# Patient Record
Sex: FEMALE | Race: WHITE | NOT HISPANIC OR LATINO | URBAN - METROPOLITAN AREA
[De-identification: names, ages, dates, MRNs, and addresses within clinical notes are randomized per-mention and may not be internally consistent; named-entity substitution may affect disease eponyms.]

---

## 2020-01-22 ENCOUNTER — OUTPATIENT (OUTPATIENT)
Dept: OUTPATIENT SERVICES | Facility: HOSPITAL | Age: 85
LOS: 1 days | Discharge: HOME | End: 2020-01-22
Payer: MEDICARE

## 2020-01-22 VITALS
OXYGEN SATURATION: 99 % | WEIGHT: 139.11 LBS | SYSTOLIC BLOOD PRESSURE: 132 MMHG | HEIGHT: 63 IN | DIASTOLIC BLOOD PRESSURE: 82 MMHG | RESPIRATION RATE: 18 BRPM | TEMPERATURE: 98 F | HEART RATE: 83 BPM

## 2020-01-22 DIAGNOSIS — Z95.5 PRESENCE OF CORONARY ANGIOPLASTY IMPLANT AND GRAFT: Chronic | ICD-10-CM

## 2020-01-22 DIAGNOSIS — Z95.0 PRESENCE OF CARDIAC PACEMAKER: Chronic | ICD-10-CM

## 2020-01-22 DIAGNOSIS — Z95.0 PRESENCE OF CARDIAC PACEMAKER: ICD-10-CM

## 2020-01-22 DIAGNOSIS — Z90.710 ACQUIRED ABSENCE OF BOTH CERVIX AND UTERUS: Chronic | ICD-10-CM

## 2020-01-22 DIAGNOSIS — E03.9 HYPOTHYROIDISM, UNSPECIFIED: ICD-10-CM

## 2020-01-22 DIAGNOSIS — G47.33 OBSTRUCTIVE SLEEP APNEA (ADULT) (PEDIATRIC): ICD-10-CM

## 2020-01-22 DIAGNOSIS — I10 ESSENTIAL (PRIMARY) HYPERTENSION: ICD-10-CM

## 2020-01-22 DIAGNOSIS — Z97.4 PRESENCE OF EXTERNAL HEARING-AID: ICD-10-CM

## 2020-01-22 DIAGNOSIS — Z95.5 PRESENCE OF CORONARY ANGIOPLASTY IMPLANT AND GRAFT: ICD-10-CM

## 2020-01-22 DIAGNOSIS — Z01.818 ENCOUNTER FOR OTHER PREPROCEDURAL EXAMINATION: ICD-10-CM

## 2020-01-22 DIAGNOSIS — M17.12 UNILATERAL PRIMARY OSTEOARTHRITIS, LEFT KNEE: ICD-10-CM

## 2020-01-22 DIAGNOSIS — I25.10 ATHEROSCLEROTIC HEART DISEASE OF NATIVE CORONARY ARTERY WITHOUT ANGINA PECTORIS: ICD-10-CM

## 2020-01-22 DIAGNOSIS — E78.5 HYPERLIPIDEMIA, UNSPECIFIED: ICD-10-CM

## 2020-01-22 LAB
ALLERGY+IMMUNOLOGY DIAG STUDY NOTE: SIGNIFICANT CHANGE UP
ANION GAP SERPL CALC-SCNC: 5 MMOL/L — SIGNIFICANT CHANGE UP (ref 5–17)
ANTIBODY INTERPRETATION 2: SIGNIFICANT CHANGE UP
ANTIBODY INTERPRETATION 3: SIGNIFICANT CHANGE UP
APTT BLD: 33.6 SEC — SIGNIFICANT CHANGE UP (ref 28.5–37)
BASOPHILS # BLD AUTO: 0.03 K/UL — SIGNIFICANT CHANGE UP (ref 0–0.2)
BASOPHILS NFR BLD AUTO: 0.4 % — SIGNIFICANT CHANGE UP (ref 0–2)
BLD GP AB SCN SERPL QL: SIGNIFICANT CHANGE UP
BUN SERPL-MCNC: 20 MG/DL — SIGNIFICANT CHANGE UP (ref 7–23)
CALCIUM SERPL-MCNC: 8.8 MG/DL — SIGNIFICANT CHANGE UP (ref 8.5–10.1)
CHLORIDE SERPL-SCNC: 107 MMOL/L — SIGNIFICANT CHANGE UP (ref 96–108)
CO2 SERPL-SCNC: 32 MMOL/L — HIGH (ref 22–31)
CREAT SERPL-MCNC: 0.63 MG/DL — SIGNIFICANT CHANGE UP (ref 0.5–1.3)
DIR ANTIGLOB POLYSPECIFIC INTERPRETATION: SIGNIFICANT CHANGE UP
EOSINOPHIL # BLD AUTO: 0.11 K/UL — SIGNIFICANT CHANGE UP (ref 0–0.5)
EOSINOPHIL NFR BLD AUTO: 1.6 % — SIGNIFICANT CHANGE UP (ref 0–6)
GLUCOSE SERPL-MCNC: 99 MG/DL — SIGNIFICANT CHANGE UP (ref 70–99)
HBA1C BLD-MCNC: 5.6 % — SIGNIFICANT CHANGE UP (ref 4–5.6)
HCT VFR BLD CALC: 33.3 % — LOW (ref 34.5–45)
HGB BLD-MCNC: 9.9 G/DL — LOW (ref 11.5–15.5)
IMM GRANULOCYTES NFR BLD AUTO: 0.3 % — SIGNIFICANT CHANGE UP (ref 0–1.5)
INR BLD: 1.13 RATIO — SIGNIFICANT CHANGE UP (ref 0.88–1.16)
LYMPHOCYTES # BLD AUTO: 0.9 K/UL — LOW (ref 1–3.3)
LYMPHOCYTES # BLD AUTO: 13.4 % — SIGNIFICANT CHANGE UP (ref 13–44)
MCHC RBC-ENTMCNC: 27.3 PG — SIGNIFICANT CHANGE UP (ref 27–34)
MCHC RBC-ENTMCNC: 29.7 GM/DL — LOW (ref 32–36)
MCV RBC AUTO: 91.7 FL — SIGNIFICANT CHANGE UP (ref 80–100)
MONOCYTES # BLD AUTO: 0.53 K/UL — SIGNIFICANT CHANGE UP (ref 0–0.9)
MONOCYTES NFR BLD AUTO: 7.9 % — SIGNIFICANT CHANGE UP (ref 2–14)
MRSA PCR RESULT.: SIGNIFICANT CHANGE UP
NEUTROPHILS # BLD AUTO: 5.15 K/UL — SIGNIFICANT CHANGE UP (ref 1.8–7.4)
NEUTROPHILS NFR BLD AUTO: 76.4 % — SIGNIFICANT CHANGE UP (ref 43–77)
NRBC # BLD: 0 /100 WBCS — SIGNIFICANT CHANGE UP (ref 0–0)
PLATELET # BLD AUTO: 206 K/UL — SIGNIFICANT CHANGE UP (ref 150–400)
POTASSIUM SERPL-MCNC: 3.5 MMOL/L — SIGNIFICANT CHANGE UP (ref 3.5–5.3)
POTASSIUM SERPL-SCNC: 3.5 MMOL/L — SIGNIFICANT CHANGE UP (ref 3.5–5.3)
PROTHROM AB SERPL-ACNC: 12.7 SEC — SIGNIFICANT CHANGE UP (ref 10–12.9)
RBC # BLD: 3.63 M/UL — LOW (ref 3.8–5.2)
RBC # FLD: 14.6 % — HIGH (ref 10.3–14.5)
S AUREUS DNA NOSE QL NAA+PROBE: SIGNIFICANT CHANGE UP
SODIUM SERPL-SCNC: 144 MMOL/L — SIGNIFICANT CHANGE UP (ref 135–145)
WBC # BLD: 6.74 K/UL — SIGNIFICANT CHANGE UP (ref 3.8–10.5)
WBC # FLD AUTO: 6.74 K/UL — SIGNIFICANT CHANGE UP (ref 3.8–10.5)

## 2020-01-22 PROCEDURE — 93010 ELECTROCARDIOGRAM REPORT: CPT

## 2020-01-22 PROCEDURE — 86077 PHYS BLOOD BANK SERV XMATCH: CPT

## 2020-01-22 NOTE — OCCUPATIONAL THERAPY INITIAL EVALUATION ADULT - ADDITIONAL COMMENTS
Patient lives with family (Who can assist post op) in a private house with 3 steps to enter with a L handrail. Once inside, the patient has 11 steps with a R rail to reach the main floor where the bedroom and bathroom is. The patient bathroom has a walk in shower stall, fixed and retractable shower head, standard toilet and grab bars. The patient reports that a 3/1 commode can fit over the toilet at home. The patient ambulates with no device and owns a straight cane. The patient daily pain in the L knee is a 5-6/10 at rest and a 8-9/10 with movement. The patient reports taking tylenol prn for pain management. The patient has no history for falls and reports having buckling in knee. The patient wears eye glasses, R handed, does not drive and wears hearing aides for both ears. The patient has macular degeneration in L eye.

## 2020-01-22 NOTE — H&P PST ADULT - NEGATIVE CARDIOVASCULAR SYMPTOMS
no dyspnea on exertion/no claudication/no paroxysmal nocturnal dyspnea/no peripheral edema/no chest pain/no palpitations/no orthopnea

## 2020-01-22 NOTE — H&P PST ADULT - HISTORY OF PRESENT ILLNESS
85yo female with medical h/o DONNA denies CPAP use, [place] with bilateral hearing aid, HTN, CAD with stents on Aspirin, HDL, PPM, Hypothyroid and OA, left knee. Pt presents today for PST for Left Total Knee Replacement scheduled for 2/5/2020

## 2020-01-22 NOTE — H&P PST ADULT - NSICDXPROBLEM_GEN_ALL_CORE_FT
PROBLEM DIAGNOSES  Problem: Unilateral primary osteoarthritis, left knee  Assessment and Plan: Pre-op instructions given. Pt verbalized understanding  Chlorhexidine wash instructions given  Pending: Medical & Cardiology clearance - Abnormal EKG + requested by surgeon    Problem: Cardiac pacemaker  Assessment and Plan: Medtronic - placed 11/7/2011    Problem: CAD (coronary artery disease)  Assessment and Plan: Pt instructed to take meds as prescribed    Problem: Stented coronary artery  Assessment and Plan: on Aspirin     Problem: Hearing aid worn  Assessment and Plan: noted- bilateral     Problem: Hypothyroid  Assessment and Plan: Pt instructed to take meds as prescribed     Problem: Hypertension  Assessment and Plan: Pt instructed to take meds as prescribed     Problem: Hyperlipidemia  Assessment and Plan: Pt instructed to take meds as prescribed     Problem: DONNA (obstructive sleep apnea)  Assessment and Plan: denies CPAP

## 2020-01-22 NOTE — PHYSICAL THERAPY INITIAL EVALUATION ADULT - ADDITIONAL COMMENTS
Pt lives in a private house with 3 steps to enter with 1 handrail to L (this is the back entrance), once inside there are 11 steps with 1 handrail to R. Pt lives in a walk in shower with grab bars in the shower, fixed and retractable shower head, standard height toilet. Pt reports that a 3:1 commode can fit over toilet. Pt does not use dme at this time, only has a cane that is easily accessible and is in good working condition. Pain is 5-6/10 at rest and can increase to 8-9/10 with ambulation and standing. Pt has relief with meds, takes tylenol prn, no recent outpatient PT, no recent falls, (+) buckling. Pt wears glasses, R hand dominant, does not drive, has hearing aids.

## 2020-01-22 NOTE — H&P PST ADULT - MUSCULOSKELETAL
details… detailed exam no joint swelling/no calf tenderness/no joint warmth/normal strength/decreased ROM due to pain/no joint erythema

## 2020-01-22 NOTE — H&P PST ADULT - NEGATIVE ENMT SYMPTOMS
no nasal discharge/no ear pain/no tinnitus/no vertigo/no sinus symptoms/no nasal obstruction/no nasal congestion

## 2020-01-22 NOTE — H&P PST ADULT - NS MD HP INPLANTS MED DEV
Pacemaker/Vascular stents/Clips/cardiac stents, PPM Medtronic Implanted 11/7/2011 Serial # [id number] Model##[id number]

## 2020-01-22 NOTE — PHYSICAL THERAPY INITIAL EVALUATION ADULT - MODIFIED CLINICAL TEST OF SENSORY INTEGRATION IN BALANCE TEST
5x Sit to Stand Test = (no hands use) 18.30 seconds, indicating significant impairment c functional mobility & strength  ; 2 Minute Walk Test = 300 feet without devices or rest stops

## 2020-01-22 NOTE — H&P PST ADULT - NEGATIVE MUSCULOSKELETAL SYMPTOMS
no arm pain R/no leg pain R/no joint swelling/no arthralgia/no neck pain/no myalgia/no arm pain L/no back pain/no muscle cramps/no muscle weakness

## 2020-01-22 NOTE — H&P PST ADULT - NSICDXPASTMEDICALHX_GEN_ALL_CORE_FT
PAST MEDICAL HISTORY:  CAD (coronary artery disease)     Hyperlipidemia     Hypertension     Hypothyroid     DONNA (obstructive sleep apnea) denies CPAP use

## 2020-02-05 ENCOUNTER — INPATIENT (INPATIENT)
Facility: HOSPITAL | Age: 85
LOS: 1 days | Discharge: HOME HEALTH | End: 2020-02-07
Attending: ORTHOPAEDIC SURGERY | Admitting: ORTHOPAEDIC SURGERY
Payer: MEDICARE

## 2020-02-05 VITALS
TEMPERATURE: 98 F | SYSTOLIC BLOOD PRESSURE: 133 MMHG | RESPIRATION RATE: 17 BRPM | HEIGHT: 63 IN | WEIGHT: 136.91 LBS | DIASTOLIC BLOOD PRESSURE: 67 MMHG | HEART RATE: 92 BPM | OXYGEN SATURATION: 98 %

## 2020-02-05 DIAGNOSIS — Z90.710 ACQUIRED ABSENCE OF BOTH CERVIX AND UTERUS: Chronic | ICD-10-CM

## 2020-02-05 DIAGNOSIS — Z95.0 PRESENCE OF CARDIAC PACEMAKER: Chronic | ICD-10-CM

## 2020-02-05 DIAGNOSIS — Z95.5 PRESENCE OF CORONARY ANGIOPLASTY IMPLANT AND GRAFT: Chronic | ICD-10-CM

## 2020-02-05 LAB
ANION GAP SERPL CALC-SCNC: 7 MMOL/L — SIGNIFICANT CHANGE UP (ref 5–17)
BLD GP AB SCN SERPL QL: SIGNIFICANT CHANGE UP
BUN SERPL-MCNC: 20 MG/DL — SIGNIFICANT CHANGE UP (ref 7–23)
CALCIUM SERPL-MCNC: 8.5 MG/DL — SIGNIFICANT CHANGE UP (ref 8.5–10.1)
CHLORIDE SERPL-SCNC: 108 MMOL/L — SIGNIFICANT CHANGE UP (ref 96–108)
CO2 SERPL-SCNC: 28 MMOL/L — SIGNIFICANT CHANGE UP (ref 22–31)
CREAT SERPL-MCNC: 0.72 MG/DL — SIGNIFICANT CHANGE UP (ref 0.5–1.3)
GLUCOSE SERPL-MCNC: 122 MG/DL — HIGH (ref 70–99)
HCT VFR BLD CALC: 30 % — LOW (ref 34.5–45)
HGB BLD-MCNC: 9.2 G/DL — LOW (ref 11.5–15.5)
MCHC RBC-ENTMCNC: 27.9 PG — SIGNIFICANT CHANGE UP (ref 27–34)
MCHC RBC-ENTMCNC: 30.7 GM/DL — LOW (ref 32–36)
MCV RBC AUTO: 90.9 FL — SIGNIFICANT CHANGE UP (ref 80–100)
NRBC # BLD: 0 /100 WBCS — SIGNIFICANT CHANGE UP (ref 0–0)
PLATELET # BLD AUTO: 183 K/UL — SIGNIFICANT CHANGE UP (ref 150–400)
POTASSIUM SERPL-MCNC: 3.7 MMOL/L — SIGNIFICANT CHANGE UP (ref 3.5–5.3)
POTASSIUM SERPL-SCNC: 3.7 MMOL/L — SIGNIFICANT CHANGE UP (ref 3.5–5.3)
RBC # BLD: 3.3 M/UL — LOW (ref 3.8–5.2)
RBC # FLD: 14.6 % — HIGH (ref 10.3–14.5)
SODIUM SERPL-SCNC: 143 MMOL/L — SIGNIFICANT CHANGE UP (ref 135–145)
WBC # BLD: 6.71 K/UL — SIGNIFICANT CHANGE UP (ref 3.8–10.5)
WBC # FLD AUTO: 6.71 K/UL — SIGNIFICANT CHANGE UP (ref 3.8–10.5)

## 2020-02-05 PROCEDURE — 88305 TISSUE EXAM BY PATHOLOGIST: CPT | Mod: 26

## 2020-02-05 PROCEDURE — 88311 DECALCIFY TISSUE: CPT | Mod: 26

## 2020-02-05 RX ORDER — SOTALOL HCL 120 MG
80 TABLET ORAL
Refills: 0 | Status: DISCONTINUED | OUTPATIENT
Start: 2020-02-05 | End: 2020-02-07

## 2020-02-05 RX ORDER — OXYCODONE HYDROCHLORIDE 5 MG/1
5 TABLET ORAL EVERY 4 HOURS
Refills: 0 | Status: DISCONTINUED | OUTPATIENT
Start: 2020-02-05 | End: 2020-02-06

## 2020-02-05 RX ORDER — OXYCODONE HYDROCHLORIDE 5 MG/1
10 TABLET ORAL EVERY 4 HOURS
Refills: 0 | Status: DISCONTINUED | OUTPATIENT
Start: 2020-02-05 | End: 2020-02-06

## 2020-02-05 RX ORDER — ISOSORBIDE MONONITRATE 60 MG/1
30 TABLET, EXTENDED RELEASE ORAL DAILY
Refills: 0 | Status: DISCONTINUED | OUTPATIENT
Start: 2020-02-05 | End: 2020-02-07

## 2020-02-05 RX ORDER — ACETAMINOPHEN 500 MG
1000 TABLET ORAL ONCE
Refills: 0 | Status: COMPLETED | OUTPATIENT
Start: 2020-02-05 | End: 2020-02-05

## 2020-02-05 RX ORDER — SENNA PLUS 8.6 MG/1
2 TABLET ORAL AT BEDTIME
Refills: 0 | Status: DISCONTINUED | OUTPATIENT
Start: 2020-02-05 | End: 2020-02-07

## 2020-02-05 RX ORDER — ONDANSETRON 8 MG/1
4 TABLET, FILM COATED ORAL EVERY 6 HOURS
Refills: 0 | Status: DISCONTINUED | OUTPATIENT
Start: 2020-02-05 | End: 2020-02-07

## 2020-02-05 RX ORDER — FENTANYL CITRATE 50 UG/ML
25 INJECTION INTRAVENOUS
Refills: 0 | Status: DISCONTINUED | OUTPATIENT
Start: 2020-02-05 | End: 2020-02-05

## 2020-02-05 RX ORDER — DEXAMETHASONE 0.5 MG/5ML
10 ELIXIR ORAL ONCE
Refills: 0 | Status: COMPLETED | OUTPATIENT
Start: 2020-02-06 | End: 2020-02-06

## 2020-02-05 RX ORDER — HYDROMORPHONE HYDROCHLORIDE 2 MG/ML
1 INJECTION INTRAMUSCULAR; INTRAVENOUS; SUBCUTANEOUS EVERY 4 HOURS
Refills: 0 | Status: DISCONTINUED | OUTPATIENT
Start: 2020-02-05 | End: 2020-02-07

## 2020-02-05 RX ORDER — FOLIC ACID 0.8 MG
1 TABLET ORAL DAILY
Refills: 0 | Status: DISCONTINUED | OUTPATIENT
Start: 2020-02-05 | End: 2020-02-07

## 2020-02-05 RX ORDER — ACETAMINOPHEN 500 MG
975 TABLET ORAL EVERY 8 HOURS
Refills: 0 | Status: DISCONTINUED | OUTPATIENT
Start: 2020-02-05 | End: 2020-02-07

## 2020-02-05 RX ORDER — FERROUS SULFATE 325(65) MG
325 TABLET ORAL
Refills: 0 | Status: DISCONTINUED | OUTPATIENT
Start: 2020-02-05 | End: 2020-02-07

## 2020-02-05 RX ORDER — LEVOTHYROXINE SODIUM 125 MCG
1 TABLET ORAL
Qty: 0 | Refills: 0 | DISCHARGE

## 2020-02-05 RX ORDER — ATORVASTATIN CALCIUM 80 MG/1
20 TABLET, FILM COATED ORAL AT BEDTIME
Refills: 0 | Status: DISCONTINUED | OUTPATIENT
Start: 2020-02-05 | End: 2020-02-07

## 2020-02-05 RX ORDER — FUROSEMIDE 40 MG
1 TABLET ORAL
Qty: 0 | Refills: 0 | DISCHARGE

## 2020-02-05 RX ORDER — ASPIRIN/CALCIUM CARB/MAGNESIUM 324 MG
325 TABLET ORAL
Refills: 0 | Status: DISCONTINUED | OUTPATIENT
Start: 2020-02-06 | End: 2020-02-07

## 2020-02-05 RX ORDER — ISOSORBIDE DINITRATE 5 MG/1
30 TABLET ORAL
Qty: 0 | Refills: 0 | DISCHARGE

## 2020-02-05 RX ORDER — METOCLOPRAMIDE HCL 10 MG
10 TABLET ORAL ONCE
Refills: 0 | Status: COMPLETED | OUTPATIENT
Start: 2020-02-05 | End: 2020-02-05

## 2020-02-05 RX ORDER — CEFAZOLIN SODIUM 1 G
2000 VIAL (EA) INJECTION EVERY 8 HOURS
Refills: 0 | Status: COMPLETED | OUTPATIENT
Start: 2020-02-05 | End: 2020-02-06

## 2020-02-05 RX ORDER — SODIUM CHLORIDE 9 MG/ML
1000 INJECTION, SOLUTION INTRAVENOUS
Refills: 0 | Status: DISCONTINUED | OUTPATIENT
Start: 2020-02-05 | End: 2020-02-05

## 2020-02-05 RX ORDER — ACETAMINOPHEN 500 MG
650 TABLET ORAL ONCE
Refills: 0 | Status: COMPLETED | OUTPATIENT
Start: 2020-02-05 | End: 2020-02-05

## 2020-02-05 RX ORDER — ZALEPLON 10 MG
5 CAPSULE ORAL AT BEDTIME
Refills: 0 | Status: DISCONTINUED | OUTPATIENT
Start: 2020-02-05 | End: 2020-02-07

## 2020-02-05 RX ORDER — ATORVASTATIN CALCIUM 80 MG/1
1 TABLET, FILM COATED ORAL
Qty: 0 | Refills: 0 | DISCHARGE

## 2020-02-05 RX ORDER — POLYETHYLENE GLYCOL 3350 17 G/17G
17 POWDER, FOR SOLUTION ORAL DAILY
Refills: 0 | Status: DISCONTINUED | OUTPATIENT
Start: 2020-02-05 | End: 2020-02-07

## 2020-02-05 RX ORDER — SODIUM CHLORIDE 9 MG/ML
3 INJECTION INTRAMUSCULAR; INTRAVENOUS; SUBCUTANEOUS EVERY 8 HOURS
Refills: 0 | Status: DISCONTINUED | OUTPATIENT
Start: 2020-02-05 | End: 2020-02-05

## 2020-02-05 RX ORDER — FENTANYL CITRATE 50 UG/ML
50 INJECTION INTRAVENOUS ONCE
Refills: 0 | Status: DISCONTINUED | OUTPATIENT
Start: 2020-02-05 | End: 2020-02-05

## 2020-02-05 RX ORDER — FUROSEMIDE 40 MG
20 TABLET ORAL DAILY
Refills: 0 | Status: DISCONTINUED | OUTPATIENT
Start: 2020-02-05 | End: 2020-02-07

## 2020-02-05 RX ORDER — LEVOTHYROXINE SODIUM 125 MCG
50 TABLET ORAL DAILY
Refills: 0 | Status: DISCONTINUED | OUTPATIENT
Start: 2020-02-05 | End: 2020-02-07

## 2020-02-05 RX ORDER — SODIUM CHLORIDE 9 MG/ML
1000 INJECTION, SOLUTION INTRAVENOUS
Refills: 0 | Status: DISCONTINUED | OUTPATIENT
Start: 2020-02-05 | End: 2020-02-06

## 2020-02-05 RX ORDER — PANTOPRAZOLE SODIUM 20 MG/1
40 TABLET, DELAYED RELEASE ORAL
Refills: 0 | Status: DISCONTINUED | OUTPATIENT
Start: 2020-02-05 | End: 2020-02-07

## 2020-02-05 RX ORDER — SOTALOL HCL 120 MG
1 TABLET ORAL
Qty: 0 | Refills: 0 | DISCHARGE

## 2020-02-05 RX ORDER — ONDANSETRON 8 MG/1
4 TABLET, FILM COATED ORAL ONCE
Refills: 0 | Status: DISCONTINUED | OUTPATIENT
Start: 2020-02-05 | End: 2020-02-05

## 2020-02-05 RX ORDER — ASCORBIC ACID 60 MG
500 TABLET,CHEWABLE ORAL
Refills: 0 | Status: DISCONTINUED | OUTPATIENT
Start: 2020-02-05 | End: 2020-02-07

## 2020-02-05 RX ORDER — MAGNESIUM HYDROXIDE 400 MG/1
30 TABLET, CHEWABLE ORAL DAILY
Refills: 0 | Status: DISCONTINUED | OUTPATIENT
Start: 2020-02-05 | End: 2020-02-07

## 2020-02-05 RX ADMIN — OXYCODONE HYDROCHLORIDE 5 MILLIGRAM(S): 5 TABLET ORAL at 15:12

## 2020-02-05 RX ADMIN — SODIUM CHLORIDE 150 MILLILITER(S): 9 INJECTION, SOLUTION INTRAVENOUS at 18:30

## 2020-02-05 RX ADMIN — SODIUM CHLORIDE 100 MILLILITER(S): 9 INJECTION, SOLUTION INTRAVENOUS at 12:33

## 2020-02-05 RX ADMIN — OXYCODONE HYDROCHLORIDE 5 MILLIGRAM(S): 5 TABLET ORAL at 22:32

## 2020-02-05 RX ADMIN — ONDANSETRON 4 MILLIGRAM(S): 8 TABLET, FILM COATED ORAL at 18:35

## 2020-02-05 RX ADMIN — Medication 100 MILLIGRAM(S): at 18:30

## 2020-02-05 RX ADMIN — OXYCODONE HYDROCHLORIDE 5 MILLIGRAM(S): 5 TABLET ORAL at 19:22

## 2020-02-05 RX ADMIN — Medication 650 MILLIGRAM(S): at 09:25

## 2020-02-05 RX ADMIN — Medication 10 MILLIGRAM(S): at 21:32

## 2020-02-05 RX ADMIN — Medication 400 MILLIGRAM(S): at 21:32

## 2020-02-05 RX ADMIN — OXYCODONE HYDROCHLORIDE 5 MILLIGRAM(S): 5 TABLET ORAL at 16:00

## 2020-02-05 RX ADMIN — Medication 975 MILLIGRAM(S): at 15:13

## 2020-02-05 RX ADMIN — Medication 1000 MILLIGRAM(S): at 21:47

## 2020-02-05 RX ADMIN — Medication 975 MILLIGRAM(S): at 16:00

## 2020-02-05 RX ADMIN — OXYCODONE HYDROCHLORIDE 5 MILLIGRAM(S): 5 TABLET ORAL at 18:35

## 2020-02-05 RX ADMIN — OXYCODONE HYDROCHLORIDE 5 MILLIGRAM(S): 5 TABLET ORAL at 21:32

## 2020-02-05 NOTE — PHYSICAL THERAPY INITIAL EVALUATION ADULT - ADDITIONAL COMMENTS
Verified postoperatively, Pt lives in a private house with 3 steps to enter with 1 handrail to L (this is the back entrance), once inside there are 11 steps with 1 handrail to R. Pt lives in a walk in shower with grab bars in the shower, fixed and retractable shower head, standard height toilet. Pt reports that a 3:1 commode can fit over toilet. Pt does not use dme at this time, only has a cane that is easily accessible and is in good working condition. Pain is 5-6/10 at rest and can increase to 8-9/10 with ambulation and standing. Pt has relief with meds, takes tylenol prn, no recent outpatient PT, no recent falls, (+) buckling. Pt wears glasses, R hand dominant, does not drive, has hearing aids.

## 2020-02-05 NOTE — OCCUPATIONAL THERAPY INITIAL EVALUATION ADULT - BALANCE TRAINING, PT EVAL
Pt will improve standing balance to Good+ to facilitate safe functional transfers by the end of 2 weeks.

## 2020-02-05 NOTE — DISCHARGE NOTE PROVIDER - PROVIDER RX CONTACT NUMBER
continue with oral iron.  follow up in 2 weeks.  call if difficulty breathing, blurred vision [phone]

## 2020-02-05 NOTE — PHYSICAL THERAPY INITIAL EVALUATION ADULT - PLANNED THERAPY INTERVENTIONS, PT EVAL
balance training/bed mobility training/gait training/Pt will be able to negotiate >4 steps using left rail up & down, 11 steps using right rail up, a cane independently without c/o pain in the left knee in 2 to 3 days/transfer training/strengthening

## 2020-02-05 NOTE — PROGRESS NOTE ADULT - SUBJECTIVE AND OBJECTIVE BOX
Post-op Check   POD#0 S/P Left TKA   86yFemale Patient seen and examined, Pain controlled  Patient Denies SOB, CP, N/V/D       PE: Left Knee/LE: Dressing C/D/I, Sensation/motor intact, DP 2+, FROM ankle/toes   B/L LE: Skin intact. +ROM hip/knee/ankle/toes. Ankle Dorsi/plantarflexion: 5/5. Calf: soft, compressible and nontender. DP/PT 2+ NVI                          9.2    6.71  )-----------( 183      ( 05 Feb 2020 12:23 )             30.0       02-05    143  |  108  |  20  ----------------------------<  122<H>  3.7   |  28  |  0.72    Ca    8.5      05 Feb 2020 12:23          A: As above   P: Pain Control       DVT Prophylaxis      Incentive spirometry      PT WBAT LLE      Isometric exercises      Discharge Planning      All the above discussed and understood by pt       Ortho to F/U

## 2020-02-05 NOTE — CONSULT NOTE ADULT - SUBJECTIVE AND OBJECTIVE BOX
Statement Selected
LISA PEDROZA is a 86y Female s/p LEFT TOTAL KNEE ARTHROPLASTY    w/ h/o DONNA (obstructive sleep apnea)  Hyperlipidemia  Hypertension  CAD (coronary artery disease)  Hypothyroid    denies any chest pain shortness of breath palpitation dizziness lightheadedness nausea vomiting fever or chills    Stented coronary artery  S/P hysterectomy  Pacemaker    No pertinent family history in first degree relatives    SH: doesnot smoke or drink at this time    macrolide antibiotics (Hives)  penicillins (Hives)  sulfa drugs (Hives)    acetaminophen   Tablet .. 975 milliGRAM(s) Oral every 8 hours  aluminum hydroxide/magnesium hydroxide/simethicone Suspension 30 milliLiter(s) Oral four times a day PRN  ascorbic acid 500 milliGRAM(s) Oral two times a day  atorvastatin 20 milliGRAM(s) Oral at bedtime  ceFAZolin   IVPB 2000 milliGRAM(s) IV Intermittent every 8 hours  ferrous    sulfate 325 milliGRAM(s) Oral three times a day with meals  folic acid 1 milliGRAM(s) Oral daily  furosemide    Tablet 20 milliGRAM(s) Oral daily  HYDROmorphone  Injectable 1 milliGRAM(s) IV Push every 4 hours PRN  isosorbide   mononitrate ER Tablet (IMDUR) 30 milliGRAM(s) Oral daily  lactated ringers. 1000 milliLiter(s) IV Continuous <Continuous>  levothyroxine 50 MICROGram(s) Oral daily  magnesium hydroxide Suspension 30 milliLiter(s) Oral daily PRN  multivitamin 1 Tablet(s) Oral daily  ondansetron Injectable 4 milliGRAM(s) IV Push every 6 hours PRN  oxyCODONE    IR 5 milliGRAM(s) Oral every 4 hours PRN  oxyCODONE    IR 10 milliGRAM(s) Oral every 4 hours PRN  oxyCODONE    IR 5 milliGRAM(s) Oral every 4 hours  pantoprazole    Tablet 40 milliGRAM(s) Oral before breakfast  polyethylene glycol 3350 17 Gram(s) Oral daily  senna 2 Tablet(s) Oral at bedtime PRN  sotalol 80 milliGRAM(s) Oral two times a day  zaleplon 5 milliGRAM(s) Oral at bedtime PRN    T(C): 36.2 (02-05-20 @ 16:20), Max: 36.4 (02-05-20 @ 09:00)  HR: 66 (02-05-20 @ 16:20) (60 - 92)  BP: 167/90 (02-05-20 @ 16:20) (105/62 - 167/90)  RR: 17 (02-05-20 @ 16:20) (14 - 20)  SpO2: 96% (02-05-20 @ 16:20) (96% - 99%)  HEENT unremarkable  neck no JVD or bruit  heart normal S1 S2 RRR no gallops or rubs  chest clear to auscultation  abd sof nontender non distended +bs  ext no calf tenderness    A/P   DVT PX  pain control  bowel regimen   wound care as per ortho  GI PX  antiemetics prn  incentive spirometer

## 2020-02-05 NOTE — DISCHARGE NOTE PROVIDER - NSDCMRMEDTOKEN_GEN_ALL_CORE_FT
aspirin 81 mg oral tablet: 1 tab(s) orally once a day  atorvastatin 20 mg oral tablet: 1 tab(s) orally once a day (at bedtime)  furosemide 20 mg oral tablet: 1 tab(s) orally once a day  isosorbide: 30 milligram(s) orally once a day  levothyroxine 50 mcg (0.05 mg) oral tablet: 1 tab(s) orally once a day  sotalol 80 mg oral tablet: 1 tab(s) orally 2 times a day  traMADol 50 mg oral tablet: 1 tab(s) orally every 4 hours, As Needed acetaminophen 325 mg oral tablet: 3 tab(s) orally every 8 hours  ascorbic acid 500 mg oral tablet: 1 tab(s) orally 2 times a day  aspirin 325 mg oral delayed release tablet: 1 tab(s) orally 2 times a day MDD:2  atorvastatin 20 mg oral tablet: 1 tab(s) orally once a day (at bedtime)  furosemide 20 mg oral tablet: 1 tab(s) orally once a day  isosorbide: 30 milligram(s) orally once a day  levothyroxine 50 mcg (0.05 mg) oral tablet: 1 tab(s) orally once a day  magnesium hydroxide 8% oral suspension: 30 milliliter(s) orally once a day, As needed, Constipation  Multiple Vitamins oral tablet: 1 tab(s) orally once a day  pantoprazole 40 mg oral delayed release tablet: 1 tab(s) orally once a day (before a meal) MDD:1  polyethylene glycol 3350 oral powder for reconstitution: 17 gram(s) orally once a day  sotalol 80 mg oral tablet: 1 tab(s) orally 2 times a day

## 2020-02-05 NOTE — PHYSICAL THERAPY INITIAL EVALUATION ADULT - CRITERIA FOR SKILLED THERAPEUTIC INTERVENTIONS
risk reduction/prevention/therapy frequency/anticipated equipment needs at discharge/anticipated discharge recommendation/impairments found/home with home PT, Pt has DME/rehab potential/predicted duration of therapy intervention/functional limitations in following categories

## 2020-02-05 NOTE — DISCHARGE NOTE PROVIDER - HOSPITAL COURSE
86yFemale with history of Left Knee Osteoarthritis presenting for Left TKA by Dr. Conroy on 2/5/20. Risk and benefits of surgery were explained to the patient.The patient understood and agreed to proceed with surgery. Patient underwent the procedure with no intraoperative complications. Pt was brought in stable condition to the PACU. Once stable in PACU, pt was brought to the floor. During hospital stay pt was followed by Medicine during this admission. Pt had an uneventful hospital course. Pt is stable for discharge to [rehab/home] on POD#[ ] 86yFemale with history of Left Knee Osteoarthritis presenting for Left TKA by Dr. Conroy on 2/5/20. Risk and benefits of surgery were explained to the patient.The patient understood and agreed to proceed with surgery. Patient underwent the procedure with no intraoperative complications. Pt was brought in stable condition to the PACU. Once stable in PACU, pt was brought to the floor. During hospital stay pt was followed by Medicine during this admission. Pt had nausea and needed to stop her narcotics during hospital course. She had hypokalmeia and she had potassium supplementation. Pt is stable for discharge to home on POD#2

## 2020-02-05 NOTE — DISCHARGE NOTE PROVIDER - CARE PROVIDER_API CALL
Al Conroy)  Orthopaedic Surgery  [address] [place] [address]  Phone: [phone]  Fax: [phone]  Follow Up Time:

## 2020-02-05 NOTE — PHYSICAL THERAPY INITIAL EVALUATION ADULT - TRANSFER SAFETY CONCERNS NOTED: BED/CHAIR, REHAB EVAL
decreased safety awareness/decreased step length/decreased balance during turns/stepping too close to front of assistive device/decreased weight-shifting ability

## 2020-02-05 NOTE — PHYSICAL THERAPY INITIAL EVALUATION ADULT - TRANSFER TRAINING, PT EVAL
Pt will be able to perform sit to stand, stand pivot transfer using [RW] and maintaining WB precautions  independently in 2 to 3 days

## 2020-02-05 NOTE — PHYSICAL THERAPY INITIAL EVALUATION ADULT - BALANCE TRAINING, PT EVAL
Pt will improve static & dynamic standing balance to Good using [Rolling walker] maintaining WB precaution  to perform ADL, Gait independently  in 2 weeks

## 2020-02-05 NOTE — OCCUPATIONAL THERAPY INITIAL EVALUATION ADULT - ADDITIONAL COMMENTS
Pre-operative assessment confirmed with Pt. Pt lives with family ( son and spouse, who can assist post op) in a private house with 3 steps to enter with  L handrail. Pt has 11 steps with R handrail inside house to bedroom and bathroom. Pt reports she was independent with ADL's and functional transfers/ ambulation with use of cane., Bathroom contains walk in shower, fixed and retractable shower head, standard toilet and grab bars.

## 2020-02-05 NOTE — OCCUPATIONAL THERAPY INITIAL EVALUATION ADULT - GENERAL OBSERVATIONS, REHAB EVAL
OT evaluation completed. Encountered Pt semi supine in bed, NAD, with spouse and daughter present + cardiac monitor, penumatic teds, IV hep lock intact to R hand and ace wrap to L knee s/p L TKA POD 0 + WBAT.

## 2020-02-05 NOTE — OCCUPATIONAL THERAPY INITIAL EVALUATION ADULT - TRANSFER TRAINING, PT EVAL
Pt will perform safe functional transfers with use of rolling walker with Modified [place] by the end of 1 week.

## 2020-02-05 NOTE — OCCUPATIONAL THERAPY INITIAL EVALUATION ADULT - RANGE OF MOTION EXAMINATION, LOWER EXTREMITY
LLE: hip WFL, knee flexion/ extension grossly decreased by 25%, ankle dorsi flexion/ plantar flexion WFL/Right LE Active ROM was WFL   (within functional limits)

## 2020-02-05 NOTE — DISCHARGE NOTE PROVIDER - NSDCACTIVITY_GEN_ALL_CORE
No heavy lifting/straining/Showering allowed/Stairs allowed/Do not drive or operate machinery/Walking - Outdoors allowed/Walking - Indoors allowed

## 2020-02-05 NOTE — PHYSICAL THERAPY INITIAL EVALUATION ADULT - GAIT TRAINING, PT EVAL
Pt will be able to ambulate 350 feet using [RW] and maintaining WB precautions  independently in 2 weeks

## 2020-02-06 LAB
ANION GAP SERPL CALC-SCNC: 7 MMOL/L — SIGNIFICANT CHANGE UP (ref 5–17)
BUN SERPL-MCNC: 16 MG/DL — SIGNIFICANT CHANGE UP (ref 7–23)
CALCIUM SERPL-MCNC: 8.5 MG/DL — SIGNIFICANT CHANGE UP (ref 8.5–10.1)
CHLORIDE SERPL-SCNC: 103 MMOL/L — SIGNIFICANT CHANGE UP (ref 96–108)
CO2 SERPL-SCNC: 28 MMOL/L — SIGNIFICANT CHANGE UP (ref 22–31)
CREAT SERPL-MCNC: 0.59 MG/DL — SIGNIFICANT CHANGE UP (ref 0.5–1.3)
GLUCOSE SERPL-MCNC: 156 MG/DL — HIGH (ref 70–99)
HCT VFR BLD CALC: 29.9 % — LOW (ref 34.5–45)
HGB BLD-MCNC: 9.1 G/DL — LOW (ref 11.5–15.5)
MCHC RBC-ENTMCNC: 27.2 PG — SIGNIFICANT CHANGE UP (ref 27–34)
MCHC RBC-ENTMCNC: 30.4 GM/DL — LOW (ref 32–36)
MCV RBC AUTO: 89.5 FL — SIGNIFICANT CHANGE UP (ref 80–100)
NRBC # BLD: 0 /100 WBCS — SIGNIFICANT CHANGE UP (ref 0–0)
PLATELET # BLD AUTO: 192 K/UL — SIGNIFICANT CHANGE UP (ref 150–400)
POTASSIUM SERPL-MCNC: 3.6 MMOL/L — SIGNIFICANT CHANGE UP (ref 3.5–5.3)
POTASSIUM SERPL-SCNC: 3.6 MMOL/L — SIGNIFICANT CHANGE UP (ref 3.5–5.3)
RBC # BLD: 3.34 M/UL — LOW (ref 3.8–5.2)
RBC # FLD: 14.5 % — SIGNIFICANT CHANGE UP (ref 10.3–14.5)
SODIUM SERPL-SCNC: 138 MMOL/L — SIGNIFICANT CHANGE UP (ref 135–145)
WBC # BLD: 9.07 K/UL — SIGNIFICANT CHANGE UP (ref 3.8–10.5)
WBC # FLD AUTO: 9.07 K/UL — SIGNIFICANT CHANGE UP (ref 3.8–10.5)

## 2020-02-06 PROCEDURE — 73560 X-RAY EXAM OF KNEE 1 OR 2: CPT | Mod: 26,LT

## 2020-02-06 RX ORDER — METOCLOPRAMIDE HCL 10 MG
10 TABLET ORAL EVERY 6 HOURS
Refills: 0 | Status: DISCONTINUED | OUTPATIENT
Start: 2020-02-06 | End: 2020-02-07

## 2020-02-06 RX ORDER — TRAMADOL HYDROCHLORIDE 50 MG/1
50 TABLET ORAL EVERY 4 HOURS
Refills: 0 | Status: DISCONTINUED | OUTPATIENT
Start: 2020-02-06 | End: 2020-02-07

## 2020-02-06 RX ORDER — METOCLOPRAMIDE HCL 10 MG
10 TABLET ORAL ONCE
Refills: 0 | Status: COMPLETED | OUTPATIENT
Start: 2020-02-06 | End: 2020-02-06

## 2020-02-06 RX ORDER — ACETAMINOPHEN 500 MG
1000 TABLET ORAL ONCE
Refills: 0 | Status: COMPLETED | OUTPATIENT
Start: 2020-02-06 | End: 2020-02-06

## 2020-02-06 RX ORDER — SODIUM CHLORIDE 9 MG/ML
1000 INJECTION, SOLUTION INTRAVENOUS
Refills: 0 | Status: DISCONTINUED | OUTPATIENT
Start: 2020-02-06 | End: 2020-02-06

## 2020-02-06 RX ORDER — KETOROLAC TROMETHAMINE 30 MG/ML
15 SYRINGE (ML) INJECTION ONCE
Refills: 0 | Status: DISCONTINUED | OUTPATIENT
Start: 2020-02-06 | End: 2020-02-06

## 2020-02-06 RX ORDER — SODIUM CHLORIDE 9 MG/ML
1000 INJECTION, SOLUTION INTRAVENOUS
Refills: 0 | Status: DISCONTINUED | OUTPATIENT
Start: 2020-02-06 | End: 2020-02-07

## 2020-02-06 RX ADMIN — Medication 50 MICROGRAM(S): at 05:10

## 2020-02-06 RX ADMIN — SODIUM CHLORIDE 500 MILLILITER(S): 9 INJECTION, SOLUTION INTRAVENOUS at 13:12

## 2020-02-06 RX ADMIN — MAGNESIUM HYDROXIDE 30 MILLILITER(S): 400 TABLET, CHEWABLE ORAL at 17:16

## 2020-02-06 RX ADMIN — Medication 1000 MILLIGRAM(S): at 05:58

## 2020-02-06 RX ADMIN — Medication 80 MILLIGRAM(S): at 17:29

## 2020-02-06 RX ADMIN — Medication 30 MILLILITER(S): at 17:16

## 2020-02-06 RX ADMIN — Medication 15 MILLIGRAM(S): at 10:40

## 2020-02-06 RX ADMIN — Medication 102 MILLIGRAM(S): at 05:10

## 2020-02-06 RX ADMIN — Medication 80 MILLIGRAM(S): at 05:43

## 2020-02-06 RX ADMIN — Medication 15 MILLIGRAM(S): at 10:03

## 2020-02-06 RX ADMIN — Medication 1000 MILLIGRAM(S): at 15:51

## 2020-02-06 RX ADMIN — Medication 325 MILLIGRAM(S): at 08:39

## 2020-02-06 RX ADMIN — Medication 400 MILLIGRAM(S): at 14:49

## 2020-02-06 RX ADMIN — Medication 325 MILLIGRAM(S): at 05:43

## 2020-02-06 RX ADMIN — POLYETHYLENE GLYCOL 3350 17 GRAM(S): 17 POWDER, FOR SOLUTION ORAL at 11:21

## 2020-02-06 RX ADMIN — Medication 975 MILLIGRAM(S): at 23:49

## 2020-02-06 RX ADMIN — Medication 20 MILLIGRAM(S): at 08:39

## 2020-02-06 RX ADMIN — PANTOPRAZOLE SODIUM 40 MILLIGRAM(S): 20 TABLET, DELAYED RELEASE ORAL at 08:39

## 2020-02-06 RX ADMIN — ISOSORBIDE MONONITRATE 30 MILLIGRAM(S): 60 TABLET, EXTENDED RELEASE ORAL at 11:22

## 2020-02-06 RX ADMIN — Medication 325 MILLIGRAM(S): at 17:29

## 2020-02-06 RX ADMIN — ATORVASTATIN CALCIUM 20 MILLIGRAM(S): 80 TABLET, FILM COATED ORAL at 21:23

## 2020-02-06 RX ADMIN — ONDANSETRON 4 MILLIGRAM(S): 8 TABLET, FILM COATED ORAL at 08:38

## 2020-02-06 RX ADMIN — Medication 100 MILLIGRAM(S): at 01:43

## 2020-02-06 RX ADMIN — Medication 10 MILLIGRAM(S): at 13:12

## 2020-02-06 RX ADMIN — Medication 500 MILLIGRAM(S): at 17:29

## 2020-02-06 RX ADMIN — ONDANSETRON 4 MILLIGRAM(S): 8 TABLET, FILM COATED ORAL at 01:43

## 2020-02-06 RX ADMIN — OXYCODONE HYDROCHLORIDE 5 MILLIGRAM(S): 5 TABLET ORAL at 01:42

## 2020-02-06 RX ADMIN — OXYCODONE HYDROCHLORIDE 5 MILLIGRAM(S): 5 TABLET ORAL at 02:42

## 2020-02-06 RX ADMIN — Medication 400 MILLIGRAM(S): at 05:43

## 2020-02-06 RX ADMIN — Medication 500 MILLIGRAM(S): at 05:43

## 2020-02-06 RX ADMIN — Medication 10 MILLIGRAM(S): at 05:42

## 2020-02-06 NOTE — PROGRESS NOTE ADULT - SUBJECTIVE AND OBJECTIVE BOX
LISA PEDROZA is a 86y Female s/p LEFT TOTAL KNEE ARTHROPLASTY      denies any chest pain shortness of breath palpitation dizziness lightheadedness nausea vomiting fever or chills    T(C): 36.3 (02-06-20 @ 09:00), Max: 37 (02-06-20 @ 00:15)  HR: 62 (02-06-20 @ 09:00) (60 - 89)  BP: 135/65 (02-06-20 @ 09:00) (105/62 - 174/79)  RR: 17 (02-06-20 @ 09:00) (14 - 20)  SpO2: 94% (02-06-20 @ 09:00) (94% - 99%)  no jvd/bruit  s1 s2 rrr  cta  s/nt/nd  no calf tend                        9.1    9.07  )-----------( 192      ( 06 Feb 2020 07:06 )             29.9   02-06    138  |  103  |  16  ----------------------------<  156<H>  3.6   |  28  |  0.59    Ca    8.5      06 Feb 2020 07:06        cont dvt px  pain control  bowel regimen  antiemetics  incentive spirometer

## 2020-02-06 NOTE — DISCHARGE NOTE NURSING/CASE MANAGEMENT/SOCIAL WORK - PATIENT PORTAL LINK FT
You can access the FollowMyHealth Patient Portal offered by [facility] by registering at the following website: [email or web address]. By joining [facility]’s FollowMyHealth portal, you will also be able to view your health information using other applications (apps) compatible with our system.

## 2020-02-07 VITALS
OXYGEN SATURATION: 96 % | DIASTOLIC BLOOD PRESSURE: 76 MMHG | SYSTOLIC BLOOD PRESSURE: 148 MMHG | RESPIRATION RATE: 17 BRPM | HEART RATE: 72 BPM | TEMPERATURE: 97 F

## 2020-02-07 LAB
ANION GAP SERPL CALC-SCNC: 6 MMOL/L — SIGNIFICANT CHANGE UP (ref 5–17)
BUN SERPL-MCNC: 13 MG/DL — SIGNIFICANT CHANGE UP (ref 7–23)
CALCIUM SERPL-MCNC: 9.2 MG/DL — SIGNIFICANT CHANGE UP (ref 8.5–10.1)
CHLORIDE SERPL-SCNC: 99 MMOL/L — SIGNIFICANT CHANGE UP (ref 96–108)
CO2 SERPL-SCNC: 31 MMOL/L — SIGNIFICANT CHANGE UP (ref 22–31)
CREAT SERPL-MCNC: 0.7 MG/DL — SIGNIFICANT CHANGE UP (ref 0.5–1.3)
GLUCOSE SERPL-MCNC: 105 MG/DL — HIGH (ref 70–99)
HCT VFR BLD CALC: 28.9 % — LOW (ref 34.5–45)
HGB BLD-MCNC: 8.9 G/DL — LOW (ref 11.5–15.5)
MCHC RBC-ENTMCNC: 27.8 PG — SIGNIFICANT CHANGE UP (ref 27–34)
MCHC RBC-ENTMCNC: 30.8 GM/DL — LOW (ref 32–36)
MCV RBC AUTO: 90.3 FL — SIGNIFICANT CHANGE UP (ref 80–100)
NRBC # BLD: 0 /100 WBCS — SIGNIFICANT CHANGE UP (ref 0–0)
PLATELET # BLD AUTO: 212 K/UL — SIGNIFICANT CHANGE UP (ref 150–400)
POTASSIUM SERPL-MCNC: 3.3 MMOL/L — LOW (ref 3.5–5.3)
POTASSIUM SERPL-SCNC: 3.3 MMOL/L — LOW (ref 3.5–5.3)
RBC # BLD: 3.2 M/UL — LOW (ref 3.8–5.2)
RBC # FLD: 14.7 % — HIGH (ref 10.3–14.5)
SODIUM SERPL-SCNC: 136 MMOL/L — SIGNIFICANT CHANGE UP (ref 135–145)
SURGICAL PATHOLOGY STUDY: SIGNIFICANT CHANGE UP
WBC # BLD: 10.86 K/UL — HIGH (ref 3.8–10.5)
WBC # FLD AUTO: 10.86 K/UL — HIGH (ref 3.8–10.5)

## 2020-02-07 RX ORDER — PANTOPRAZOLE SODIUM 20 MG/1
1 TABLET, DELAYED RELEASE ORAL
Qty: 30 | Refills: 0
Start: 2020-02-07 | End: 2020-03-07

## 2020-02-07 RX ORDER — ASPIRIN/CALCIUM CARB/MAGNESIUM 324 MG
1 TABLET ORAL
Qty: 0 | Refills: 0 | DISCHARGE

## 2020-02-07 RX ORDER — MAGNESIUM HYDROXIDE 400 MG/1
30 TABLET, CHEWABLE ORAL
Qty: 0 | Refills: 0 | DISCHARGE
Start: 2020-02-07

## 2020-02-07 RX ORDER — ASPIRIN/CALCIUM CARB/MAGNESIUM 324 MG
1 TABLET ORAL
Qty: 60 | Refills: 0
Start: 2020-02-07 | End: 2020-03-07

## 2020-02-07 RX ORDER — POTASSIUM CHLORIDE 20 MEQ
20 PACKET (EA) ORAL
Refills: 0 | Status: COMPLETED | OUTPATIENT
Start: 2020-02-07 | End: 2020-02-07

## 2020-02-07 RX ORDER — TRAMADOL HYDROCHLORIDE 50 MG/1
1 TABLET ORAL
Qty: 0 | Refills: 0 | DISCHARGE

## 2020-02-07 RX ORDER — ACETAMINOPHEN 500 MG
3 TABLET ORAL
Qty: 0 | Refills: 0 | DISCHARGE
Start: 2020-02-07

## 2020-02-07 RX ORDER — ASCORBIC ACID 60 MG
1 TABLET,CHEWABLE ORAL
Qty: 0 | Refills: 0 | DISCHARGE
Start: 2020-02-07

## 2020-02-07 RX ORDER — POLYETHYLENE GLYCOL 3350 17 G/17G
17 POWDER, FOR SOLUTION ORAL
Qty: 0 | Refills: 0 | DISCHARGE
Start: 2020-02-07

## 2020-02-07 RX ADMIN — Medication 20 MILLIEQUIVALENT(S): at 11:34

## 2020-02-07 RX ADMIN — Medication 20 MILLIEQUIVALENT(S): at 09:31

## 2020-02-07 RX ADMIN — POLYETHYLENE GLYCOL 3350 17 GRAM(S): 17 POWDER, FOR SOLUTION ORAL at 11:34

## 2020-02-07 RX ADMIN — Medication 80 MILLIGRAM(S): at 06:28

## 2020-02-07 RX ADMIN — Medication 325 MILLIGRAM(S): at 06:28

## 2020-02-07 RX ADMIN — Medication 325 MILLIGRAM(S): at 11:34

## 2020-02-07 RX ADMIN — SODIUM CHLORIDE 75 MILLILITER(S): 9 INJECTION, SOLUTION INTRAVENOUS at 06:28

## 2020-02-07 RX ADMIN — Medication 20 MILLIGRAM(S): at 06:28

## 2020-02-07 RX ADMIN — Medication 50 MICROGRAM(S): at 06:28

## 2020-02-07 RX ADMIN — Medication 975 MILLIGRAM(S): at 06:28

## 2020-02-07 RX ADMIN — Medication 1 MILLIGRAM(S): at 11:34

## 2020-02-07 RX ADMIN — Medication 1 TABLET(S): at 11:34

## 2020-02-07 RX ADMIN — Medication 975 MILLIGRAM(S): at 00:45

## 2020-02-07 RX ADMIN — Medication 975 MILLIGRAM(S): at 07:25

## 2020-02-07 RX ADMIN — Medication 500 MILLIGRAM(S): at 06:28

## 2020-02-07 RX ADMIN — Medication 325 MILLIGRAM(S): at 08:00

## 2020-02-07 RX ADMIN — PANTOPRAZOLE SODIUM 40 MILLIGRAM(S): 20 TABLET, DELAYED RELEASE ORAL at 06:28

## 2020-02-07 NOTE — PROGRESS NOTE ADULT - SUBJECTIVE AND OBJECTIVE BOX
LISA PEDROZA is a 86y Female s/p LEFT TOTAL KNEE ARTHROPLASTY  by Dr. Conroy on 2/5/20. complains of postop pain; patient tolerated surgery well.  patient feels better than yesterday, although she had and restless night.    ROS: no pulmonary, cardiovascular, gastrointestinal, urological or neurological symptoms.     PHYS: T(C): 36.4 (02-07-20 @ 05:00), Max: 36.7 (02-06-20 @ 16:49)  HR: 67 (02-07-20 @ 05:00) (62 - 97)  BP: 154/81 (02-07-20 @ 05:00) (134/72 - 163/75)  RR: 17 (02-07-20 @ 05:00) (16 - 17)  SpO2: 96% (02-07-20 @ 05:00) (94% - 98%)  vss; lungs, clear; heart, reg rhythm, no murmurs, rubs or gallops;   abd, soft, non tender, normal bowel sounds, no calf tenderness or edema                         8.9    10.86 )-----------( 212      ( 07 Feb 2020 06:41 )             28.9   02-06    138  |  103  |  16  ----------------------------<  156<H>  3.6   |  28  |  0.59    Ca    8.5      06 Feb 2020 07:06    Assessment and Plan: status post right total knee replacement; Hypertension; hyperlipidemia; coronary artery disease; hypothyroid; obstructive sleep apnea; postop anemia; postop leucocytosis; random elevated glucose; pain control; deep vein thrombophlebitis prophylaxis; physical therapy; bowel regimen; nutrition support; follow up labs; will follow.

## 2020-02-07 NOTE — PROGRESS NOTE ADULT - SUBJECTIVE AND OBJECTIVE BOX
Patient is seen and examined at bedside. Denies CP/SOB/Dizziness/N/V/D/HA. Pain is controlled.     Vital Signs Last 24 Hrs  T(C): 36.4 (07 Feb 2020 05:00), Max: 36.7 (06 Feb 2020 16:49)  T(F): 97.5 (07 Feb 2020 05:00), Max: 98 (06 Feb 2020 16:49)  HR: 69 (07 Feb 2020 09:30) (63 - 97)  BP: 131/59 (07 Feb 2020 09:30) (116/58 - 163/75)  BP(mean): --  RR: 17 (07 Feb 2020 05:00) (16 - 17)  SpO2: 96% (07 Feb 2020 09:30) (95% - 98%)      PHYSICAL EXAM:  General: NAD, WDWN.   Neuro:  Alert & responsive  HEENT: NCAT, EOMI, conjunctiva clear  abd: soft, NT/ND  Right LE: Motor intact + EHL/FHL/TA/GS.  Sensation is grossly intact.  Extremity warm, compartments soft, compressible. No calf tenderness. DP 2+   Left LE: Prineo dressing C/D/I. Motor intact +EHL/FHL/TA/GS. Sensation is grossly intact. Extremity warm, compartments soft, compressible. No calf tenderness. DP2+    Labs:                          8.9    10.86 )-----------( 212      ( 07 Feb 2020 06:41 )             28.9       02-07    136  |  99  |  13  ----------------------------<  105<H>  3.3<L>   |  31  |  0.70    Ca    9.2      07 Feb 2020 06:41        A/P: Patient is a 86y y/o Female s/p left total knee arthroplasty, POD # 2  -wound care, knee extension/leg elevation, cryocuff, isometric exercises, new medications reviewed with pt  -Nausea resolved. Tolerating PO. Feeling much better today -Pain control/analgesia - tylenol  -Inc spirometry reviewed with pt, demonstrated competence  -DVT prophylaxis with Venodynes/Aspirin  -Hypokalemia: replete KCL.   -PT/OT/WBAT  -Pt seen in am with Dr. Conroy  -medical consult appreciated  -DC plan: home today after PT  -DC planning:

## 2020-02-13 DIAGNOSIS — D62 ACUTE POSTHEMORRHAGIC ANEMIA: ICD-10-CM

## 2020-02-13 DIAGNOSIS — I10 ESSENTIAL (PRIMARY) HYPERTENSION: ICD-10-CM

## 2020-02-13 DIAGNOSIS — Z90.710 ACQUIRED ABSENCE OF BOTH CERVIX AND UTERUS: ICD-10-CM

## 2020-02-13 DIAGNOSIS — Z95.0 PRESENCE OF CARDIAC PACEMAKER: ICD-10-CM

## 2020-02-13 DIAGNOSIS — Z88.1 ALLERGY STATUS TO OTHER ANTIBIOTIC AGENTS STATUS: ICD-10-CM

## 2020-02-13 DIAGNOSIS — E03.9 HYPOTHYROIDISM, UNSPECIFIED: ICD-10-CM

## 2020-02-13 DIAGNOSIS — Z88.2 ALLERGY STATUS TO SULFONAMIDES: ICD-10-CM

## 2020-02-13 DIAGNOSIS — H35.30 UNSPECIFIED MACULAR DEGENERATION: ICD-10-CM

## 2020-02-13 DIAGNOSIS — E87.6 HYPOKALEMIA: ICD-10-CM

## 2020-02-13 DIAGNOSIS — G47.33 OBSTRUCTIVE SLEEP APNEA (ADULT) (PEDIATRIC): ICD-10-CM

## 2020-02-13 DIAGNOSIS — E78.5 HYPERLIPIDEMIA, UNSPECIFIED: ICD-10-CM

## 2020-02-13 DIAGNOSIS — I25.10 ATHEROSCLEROTIC HEART DISEASE OF NATIVE CORONARY ARTERY WITHOUT ANGINA PECTORIS: ICD-10-CM

## 2020-02-13 DIAGNOSIS — Z95.5 PRESENCE OF CORONARY ANGIOPLASTY IMPLANT AND GRAFT: ICD-10-CM

## 2020-02-13 DIAGNOSIS — M17.12 UNILATERAL PRIMARY OSTEOARTHRITIS, LEFT KNEE: ICD-10-CM

## 2020-02-13 DIAGNOSIS — D72.829 ELEVATED WHITE BLOOD CELL COUNT, UNSPECIFIED: ICD-10-CM

## 2020-02-13 DIAGNOSIS — Z88.0 ALLERGY STATUS TO PENICILLIN: ICD-10-CM

## 2021-08-20 NOTE — PHYSICAL THERAPY INITIAL EVALUATION ADULT - LEVEL OF INDEPENDENCE: SIT/STAND, REHAB EVAL
{PROVIDER CHARTING PREFERENCE:396727}    Keshia Ibarra is a 43 year old who presents for the following health issues {ACCOMPANIED BY STATEMENT (Optional):770692}    HPI     Hypertension Follow-up      Do you check your blood pressure regularly outside of the clinic? { :395693}     Are you following a low salt diet? { :849351}    Are your blood pressures ever more than 140 on the top number (systolic) OR more   than 90 on the bottom number (diastolic), for example 140/90? { :848627}    {Depression and Anxiety Followup:335350}    How many servings of fruits and vegetables do you eat daily?  { :903136}    On average, how many sweetened beverages do you drink each day (Examples: soda, juice, sweet tea, etc.  Do NOT count diet or artificially sweetened beverages)?   { 1-11:669325}    How many days per week do you exercise enough to make your heart beat faster? { :443090}    How many minutes a day do you exercise enough to make your heart beat faster? { :498291}    How many days per week do you miss taking your medication? {0-7 :329838}    {additonal problems for provider to add (Optional):717717}    Review of Systems   {ROS COMP (Optional):737681}      Objective    There were no vitals taken for this visit.  There is no height or weight on file to calculate BMI.  Physical Exam   {Exam List (Optional):187907}    {Diagnostic Test Results (Optional):988223}    {AMBULATORY ATTESTATION (Optional):518089}         supervision

## 2022-05-02 NOTE — BRIEF OPERATIVE NOTE - NSICDXBRIEFPOSTOP_GEN_ALL_CORE_FT
- POCT urine dip showed +leukocytes but was otherwise within normal limits  Will await urine culture results  Discussed that urine color is likely a sign of adequate hydration; kidney function reviewed and within normal limits  POST-OP DIAGNOSIS:  Primary osteoarthritis of left knee 05-Feb-2020 11:36:14  Ernesto Hodgson

## 2022-07-18 NOTE — OCCUPATIONAL THERAPY INITIAL EVALUATION ADULT - LONG TERM MEMORY, REHAB EVAL
Physician Progress Note    Subjective Patient is resting comfortably in bed on 2 L of O2 nasal cannula which was started over the weekend due to worsening hypoxia.  He was also started on dexamethasone 6 mg daily.  Denies fever chills.  Does cough when he takes a deep breath.    Review of Systems  Review of Systems   Eyes: Negative.    Respiratory: Positive for cough and shortness of breath.    Cardiovascular: Negative for chest pain and palpitations.   Gastrointestinal: Negative for abdominal pain, diarrhea, nausea and vomiting.   Genitourinary: Negative.    Musculoskeletal: Negative.    Neurological: Negative for dizziness, weakness, light-headedness and numbness.        Objective     I/O's    Intake/Output Summary (Last 24 hours) at 7/18/2022 1012  Last data filed at 7/18/2022 0500  Gross per 24 hour   Intake 460 ml   Output 800 ml   Net -340 ml       Last Recorded Vitals  Vitals with min/max:      Vital Last Value 24 Hour Range   Temperature 99 °F (37.2 °C) (07/18/22 0936) Temp  Min: 97.7 °F (36.5 °C)  Max: 99.9 °F (37.7 °C)   Pulse 75 (07/18/22 0936) Pulse  Min: 60  Max: 75   Respiratory 18 (07/18/22 0936) Resp  Min: 16  Max: 18   Non-Invasive  Blood Pressure 102/63 (07/18/22 0936) BP  Min: 102/63  Max: 152/77   Pulse Oximetry 95 % (07/18/22 0936) SpO2  Min: 92 %  Max: 96 %   Arterial   Blood Pressure   No data recorded      Body mass index is 24.77 kg/m².    Physical Exam  HENT:      Neck: Neck supple.   Cardiovascular:      Rate and Rhythm: Normal rate. Rhythm irregular.      Pulses: Normal pulses.      Heart sounds: Normal heart sounds.   Pulmonary:      Breath sounds: Normal breath sounds. No rhonchi or rales.   Abdominal:      General: Bowel sounds are normal.      Tenderness: There is no abdominal tenderness. There is no guarding.   Musculoskeletal:         General: Normal range of motion.   Skin:     General: Skin is dry.   Neurological:      General: No focal deficit present.         Imaging  No results  Neutrophil, Percent 71 %    Lymphocytes, Percent 15 %    Mono, Percent 11 %    Eosinophils, Percent 1 %    Basophils, Percent 1 %    Immature Granulocytes 1 %    Absolute Neutrophils 4.5 1.8 - 7.7 K/mcL    Absolute Lymphocytes 0.9 (L) 1.0 - 4.0 K/mcL    Absolute Monocytes 0.7 0.3 - 0.9 K/mcL    Absolute Eosinophils  0.1 0.0 - 0.5 K/mcL    Absolute Basophils 0.0 0.0 - 0.3 K/mcL    Absolute Immmature Granulocytes 0.0 0.0 - 0.2 K/mcL   GLUCOSE, BEDSIDE - POINT OF CARE    Collection Time: 07/18/22  6:18 AM   Result Value Ref Range    GLUCOSE, BEDSIDE - POINT OF CARE 112 (H) 70 - 99 mg/dL         DVT Prophylaxis      Smoking Cessation  Counseling given: Not Answered       Assessment/Plan    Near syncope  No dizziness or weakness presently.  Previous episode probably related to COVID    COVID-19 virus infection  Has completed remdesivir.  Continue dexamethasone and monitor O2 sats    Cardiac arrhythmia  Chronic and does not require treatment    Congestive heart failure (CMS/HCC)  Stable on present medication     [unfilled]          Bhavin Resendiz MD  7/18/2022 10:12 AM    intact

## 2022-09-15 NOTE — H&P PST ADULT - NSALCOHOLAMT_GEN_A_CORE_SD
chest wall non-tender, breathing is unlabored without accessory muscle use, normal breath sounds , chest wall non-tender, breathing is unlabored without accessory muscle use, normal breath sounds
1-2 drinks

## 2023-12-28 PROBLEM — Z00.00 ENCOUNTER FOR PREVENTIVE HEALTH EXAMINATION: Status: ACTIVE | Noted: 2023-12-28

## 2024-01-02 NOTE — DISCHARGE NOTE PROVIDER - NSDCDCMDCOMP_GEN_ALL_CORE
CC:  Jaylyn Boland is here today for Dr. Huynh.    Medications: medications verified and updated  Refills needed today?  NO  denies Latex allergy or sensitivity  Tobacco history: verified    PMD - Feliciano Salinas    Would like to discuss birth control.       Patient would like communication of their results via:  Opposing Views    Patient's current [facility] status: Active.  Health Maintenance   Topic Date Due    COVID-19 Vaccine (1) Never done    Chlamydia and Gonorrhea Screening (if sexually active)  10/15/2021    Influenza Vaccine (1) 09/01/2023    Cervical Cancer Screening - <30 3 year  10/15/2023    DTaP/Tdap/Td Vaccine (10 - Td or Tdap) 11/20/2029    Varicella Vaccine  Completed    Meningococcal Vaccine  Completed    Hepatitis B Vaccine  Completed    Meningococcal Serogroup B Vaccine  Completed    HPV Vaccine  Completed    Pneumococcal Vaccine 0-64  Aged Out     Chaperone needed    Depo injection given. Patient aware to return to clinic between March 20th and April 3rd for next injection.   VIS given and discussed with Patient.   Patient tolerated without incident. See immunization grid for documentation.       This document is complete and the patient is ready for discharge.

## 2024-03-06 NOTE — H&P PST ADULT - TRANSFUSION PREMEDICATION REQUIRED
Ines Ann is a 36 ALF Fuentes   unsure

## 2025-05-15 NOTE — OCCUPATIONAL THERAPY INITIAL EVALUATION ADULT - BALANCE DISTURBANCE, IDENTIFIED IMPAIRMENT CONTRIBUTE, REHAB EVAL
May 15, 2025       Bentley Carroll MD  [id number]  [facility]  [facility] [address]  Via In Basket      Patient: Trinity Portillo   YOB: 1964   Date of Visit: 5/15/2025       Dear Dr. Carroll:    I saw your patient, Trinity Portillo, for an evaluation. Below are my notes for this visit with her.    If you have questions, please do not hesitate to call me.      Sincerely,        Jordan Feliciano MD        CC: No Recipients  Jordan Feliciano MD  5/15/2025 12:09 PM  Signed  [facility]  GI Consultation Note    REQUESTING PROVIDER:  Bentley Carroll MD    CHIEF COMPLAINT:  Follow-up (For treatment and infusions //chrohns hasn't changed)     SUBJECTIVE:    Trinity Portillo is a 60 year old female seen today at the request of Bentley Carroll MD for follow-up of Crohn's disease.  She has a sigmoid stricture and significant Crohn's of the left and right colon.  She was recently started on Skyrizi and she just finished her third infusion for induction.  She still having significant diarrhea and does not feel significantly better at all.    PROBLEM LIST:                  Patient Active Problem List   Diagnosis   • History of breast cancer in female   • Hyperlipidemia   • Musculoskeletal neck pain   • Crohn's disease of colon with complication  (CMD)   • History of seizures   • History of blood clot in brain   • Tobacco use disorder       HISTORIES:    ALLERGIES:   Allergen Reactions   • Clarithromycin Other (See Comments)   • Codeine NAUSEA     Other reaction(s): Other (See Comments)  \"DISOREINTED\"     Past Medical History:   Diagnosis Date   • Blood coagulation disorder  (CMD)    • Breast cancer  (CMD)    • Crohn's disease  (CMD)    • CVA (cerebral vascular accident)  (CMD)    • Dyslipidemia    • Nephrolithiasis    • Seizures  (CMD)    • Smoker      Past Surgical History:   Procedure Laterality Date   • Bowel resection     • Brain surgery     • Breast lumpectomy Right    •   section, classic     • Cystourethroscopy     • Gallbladder surgery     • Lymphadenectomy      rt side    • Wrist fracture surgery       Social History     Tobacco Use   • Smoking status: Every Day     Current packs/day: 0.50     Average packs/day: 0.5 packs/day for 47.4 years (23.7 ttl pk-yrs)     Types: Cigarettes     Start date: 1978     Passive exposure: Never   • Smokeless tobacco: Never   Substance Use Topics   • Alcohol use: Not Currently     Comment: 2 a year      Drug Use:    Not Current*       Family History   Problem Relation Age of Onset   • Depression Mother    • Hypertension Mother    • Schizophrenia Mother    • Heart disease Father    • Stroke Father    • Cancer Father         lung and renal   • Cancer, Breast Maternal Aunt         m aunt x4        MEDICATIONS:       Medications    Medication Directions   riSANKIzumab-rzaa (Skyrizi) 360 MG/2.4ML injection Inject 1.2 mLs into the skin every 8 weeks. Maintenance for Crohn's   carBAMazepine (TEGretol) 200 MG tablet TAKE 1 TABLET BY MOUTH EVERY MORNING AND 2 TABLETS BY MOUTH EVERY EVENING   rosuvastatin (CRESTOR) 20 MG tablet Take 1 tablet by mouth daily.   VEDOLizumab (ENTYVIO) 300 MG injection Inject 300 mg into the vein every 8 weeks. [facility]   aspirin 81 MG chewable tablet Chew 81 mg by mouth daily.       REVIEW OF SYSTEMS:    All other systems are reviewed and are negative except as documented in the history of present illness.    PHYSICAL EXAM:    Vital Signs: Blood pressure 138/77, pulse 98, temperature 98.4 °F (36.9 °C), temperature source Oral, resp. rate 14, height 5' 4.02\" (1.626 m), weight 55 kg (121 lb 4.1 oz), SpO2 100%.  General: The patient is well developed, well nourished, in no acute distress, appears stated age.   Neurologic: Alert and oriented. Normal mood and affect, normal speech, no gross tremor.  Skin: Warm and dry, normal turgor.  Head: Normocephalic.  Eyes: Normal conjunctivae and sclerae.  Extraocular movements  grossly intact.  HEENT: Oropharynx clear, moist mucous membranes, nasal mucosa is normal to inspection.  Neck: Symmetric without swelling or tenderness.   Respiratory: Lungs are clear to auscultation bilaterally  Cardiovascular: Normal S1 and S2, Regular rate and rhythm.  Extremities: No edema or tenderness.  Abdomen: Soft, non-tender, non-distended, no hepatosplenomegaly,  no rebound or guarding.  Normal bowel sounds.      LABORATORY DATA:   Ancillary Procedure on 01/27/2025   Component Date Value Ref Range Status   • AV Stenosis Severity Text 01/27/2025 Absent   Corrected   • Aortic Valve Area 01/27/2025 1.63   Corrected   • AV Peak Gradient 01/27/2025 9.00   Corrected   • AV Mean Gradient 01/27/2025 5.00   Corrected   • AV Peak Velocity 01/27/2025 1.47   Corrected   • AV Mean Velocity 01/27/2025 0.98   Corrected   • Ejection Fraction 01/27/2025 60%   Corrected   • AV VTI (Previously displayed as AV* 01/27/2025 0.98   Corrected   • MV Peak E Velocity 01/27/2025 0.80   Corrected   • MV Peak A Velocity 01/27/2025 208   Corrected   • E Wave Decelaration Time 01/27/2025 [id number]   Corrected   • MV E Wave Cheng/E Tissue Cheng Med 01/27/2025 10.3   Corrected   • MV E Tissue Cheng Med 01/27/2025 11.1   Corrected   • MV E Tissue Cheng Lat 01/27/2025 1.12   Corrected   • Tricuspid Valve Peak Regurgitation* 01/27/2025 2.7   Corrected   • Ascending Aorta 01/27/2025 3   Corrected   • TV Estimated Right Arterial Pressu* 01/27/2025 15.8   Corrected   • RV End Systolic Longitudinal Strai* 01/27/2025 1.9   Corrected   • LV outflow tract 01/27/2025 17.7   Corrected   • LVOT VTI 01/27/2025 0.84   Corrected   • STACIE LVOT Peak Gradient 01/27/2025 0.8   Corrected   • LV end diastolic posterior wall th* 01/27/2025 4.3   Corrected   • Left Ventricular Internal Dimensio* 01/27/2025 3.0   Corrected   • Left Internal Dimenson in Systole 01/27/2025 0.9   Corrected   • Interventricular Septum in End Hollie* 01/27/2025 1.96   Corrected   Hospital  Outpatient Visit on 01/15/2025   Component Date Value Ref Range Status   • Case Report 01/15/2025    Final                    Value:Surgical Pathology                                Case: [id number]                                  Authorizing Provider:  Jordan Feliciano MD       Collected:           01/15/2025 1536              Ordering Location:     [facility]         Received:            01/16/2025 0720                                     GASTROENTEROLOGY                                                             Pathologist:           Waldemar Schmid MD                                                               Specimens:   A) - Colon, Sigmoid Stricture Bx                                                                    B) - Colon, Ascending Colon Crohn's/Colitis Bx                                            • Pathologic Diagnosis 01/15/2025    Final                    Value:A. Sigmoid Stricture Biopsy:  -Colonic mucosa with severe active chronic colitis associated with ulceration, cryptitis, crypt abscess and glandular distortion  -Reactive epithelial atypia  -No granulomas identified    B. Ascending Colon Crohn's/Colitis Biopsy:  -Colonic mucosa with severe active chronic colitis associated with ulceration, cryptitis, crypt abscess and glandular distortion  -Reactive epithelial atypia  -No granulomas identified     • Clinical Information 01/15/2025    Final                    Value:Order / Surgery Diagnosis:  K50.80 - Crohn's disease of both small and large intestine without complications  (CMD) [ICD-10-CM]    Radiology Diagnosis:  No Dx found.           • Gross Description 01/15/2025    Final                    Value:A.  The specimen is received in formalin labeled with the patient's name and \"sigmoid stricture BX\". It consists of 5 tan soft tissue fragments ranging from 0.2-0.5 cm and measuring 0.8 x 0.5 x 0.1 cm in aggregate.  Sections:       A1: Entire specimen  B.  The specimen is received in  formalin labeled with the patient's name and \"ascending colon growth/colitis BX\". It consists of multiple tan soft tissue fragments ranging from 0.1-0.4 cm and measuring 1.0 x 0.5 x 0.1 cm in aggregate.  Sections:       B1-B2: Entire specimen    Jules Vallecillo 01/16/25 8:26 AM       • Disclaimer 01/15/2025    Final                    Value:The attending pathologist whose signature appears on this report has reviewed the diagnostic studies and has edited the gross and/or microscopic portion of the report in rendering the final diagnosis.All specimens are received in formalin, unless otherwise specified in the gross description. Specimen testing is performed on formalin-fixed paraffin-embedded sections unless otherwise specified.    The immunohistochemical, FISH, or MAN reagents (if any) were developed and their performance characteristics determined by [facility].  Some of the immunohistochemical reagents (if utilized) have not been cleared or approved by the US Food and Drug Administration. The FDA does not require this test to go through premarket FDA review. This test is used for clinical purposes. It should not be regarded as investigational or for research. This laboratory is certified under the Clinical Laboratory Improvement Amendments (CLIA) as qualified to perform high complexity clinical laboratory                           testing. All controls show appropriate reactivity.    Chromogenic In-Situ Hybridization (CISH) staining for cases originating at [facility] is performed at the [place] [address] and Chromogenic In-Situ Hybridization (CISH) staining for cases originating at [facility] is performed at the [facility] at [address] [place] [address]. Fluorescent In-Situ Hybridization (FISH) staining and interpretation for both states is performed at the [address]  [place], [place] [address].     Lab Services on 11/22/2024   Component Date Value Ref Range Status   • Fasting Status 11/22/2024 6  0 - 999 Hours Final   • Sodium 11/22/2024 140  135 - 145 mmol/L Final   • Potassium 11/22/2024 3.8  3.4 - 5.1 mmol/L Final   • Chloride 11/22/2024 106  97 - 110 mmol/L Final   • Carbon Dioxide 11/22/2024 28  21 - 32 mmol/L Final   • Anion Gap 11/22/2024 10  7 - 19 mmol/L Final   • Glucose 11/22/2024 93  70 - 99 mg/dL Final   • BUN 11/22/2024 19  6 - 20 mg/dL Final   • Creatinine 11/22/2024 0.40 (L)  0.51 - 0.95 mg/dL Final   • Glomerular Filtration Rate 11/22/2024 >90  >=60 Final    eGFR results = or >60 mL/min/1.73m2 = Normal kidney function. Estimated GFR calculated using the CKD-EPI-R (2021) equation that does not include race in the creatinine calculation.   • BUN/Cr 11/22/2024 48 (H)  7 - 25 Final   • Calcium 11/22/2024 9.5  8.4 - 10.2 mg/dL Final   • TSH 11/22/2024 0.694  0.350 - 5.000 mcUnits/mL Final   Office Visit on 11/22/2024   Component Date Value Ref Range Status   • COLOR, URINALYSIS 11/22/2024 Straw   Final   • APPEARANCE, URINALYSIS 11/22/2024 Clear   Final   • GLUCOSE, URINALYSIS 11/22/2024 Negative  Negative mg/dL Final   • BILIRUBIN, URINALYSIS 11/22/2024 Negative  Negative Final   • KETONES, URINALYSIS 11/22/2024 Negative  Negative mg/dL Final   • SPECIFIC GRAVITY, URINALYSIS 11/22/2024 1.026  1.005 - 1.030 Final    Measured by refractometry   • OCCULT BLOOD, URINALYSIS 11/22/2024 Negative  Negative Final   • PH, URINALYSIS 11/22/2024 5.5  5.0 - 7.0 Final   • PROTEIN, URINALYSIS 11/22/2024 Negative  Negative mg/dL Final   • UROBILINOGEN, URINALYSIS 11/22/2024 0.2  0.2, 1.0 mg/dL Final   • NITRITE, URINALYSIS 11/22/2024 Negative  Negative Final   • LEUKOCYTE ESTERASE, URINALYSIS 11/22/2024 Trace (A)  Negative Final   • SQUAMOUS EPITHELIAL, URINALYSIS 11/22/2024 1 to 5  None Seen, 1 to 5 /hpf Final   • ERYTHROCYTES, URINALYSIS 11/22/2024 1 to 2  None Seen, 1 to 2  /hpf Final   • LEUKOCYTES, URINALYSIS 11/22/2024 1 to 5  None Seen, 1 to 5 /hpf Final   • BACTERIA, URINALYSIS 11/22/2024 None Seen  None Seen /hpf Final   • HYALINE CASTS, URINALYSIS 11/22/2024 None Seen  None Seen, 1 to 5 /lpf Final   • MUCUS 11/22/2024 Present   Final   • Urine, Bacterial Culture 11/22/2024 10,000 - 50,000 CFU/mL Mixed bacterial funmilayo with no predominating type   Final       RECENT IMAGING:     LAST MRI:  === 04/08/21 ===    MRI OUTSIDE STUDY    - Narrative -  This is a study performed at an outside facility with images uploaded to [facility].    LAST CT:  === 06/03/21 ===    CTA HEAD W WO CONTRAST    - Narrative -  EXAMINATION:  1. Computed tomographic angiography (CTA) of the head without and with  contrast  2. CTA of the neck with contrast    HISTORY: Stroke    TECHNIQUE: CT of the head was performed without contrast according to  standard protocol. Then CT angiography of the head and neck was obtained  after the uneventful administration of 100 mL of Omnipaque 350 intravenous  contrast. Three dimensional postprocessing was performed by the  technologist and sent to the workstation for review. Artificial  intelligence large vessel occlusion detection was applied to the CTA data.    COMPARISON: Comparison is made with a head CT from 5/8/2004.    FINDINGS:    Non-angiographic findings:    No acute intracranial hemorrhage is identified. A large volume chronic  infarct in the right middle cerebral artery territory is again seen with  cystic encephalomalacia and adjacent gliosis. The ventricles are of normal  size, shape, and morphology. The basilar cisterns are patent. No mass  effect or midline shift is seen. Periventricular white matter  hypoattenuation is indicative of chronic small vessel ischemic disease.  There is vascular calcification of the carotid siphons. Other than mild  paranasal sinus disease, the visualized portions of the orbits, paranasal  sinuses, and mastoids appear normal.  No acute fracture is identified. A  chronic right frontal jaspal hole is noted.    Centrilobular and paraseptal emphysema is noted. Degenerative changes are  seen in the spine.    Neck angiography:    The visualized aortic arch appears normal. The configuration of the  brachiocephalic vessels is typical. The innominate artery and both  subclavian arteries are normal in caliber. The common carotid arteries  appear normal. There is minimal atherosclerotic disease at the carotid  bifurcations without significant focal stenosis. The cervical internal  carotid arteries appear normal. The cervical vertebral arteries are normal  with a dominant left vertebral artery.    Head angiography:    The distal internal carotid arteries appear normal. The anterior cerebral  arteries appear normal. The middle cerebral arteries appear normal. The  distal vertebral arteries appear normal. The basilar artery is normal. The  posterior cerebral arteries are normal with fetal origin on the right. No  aneurysm, vascular occlusion, or significant intracranial stenosis is  identified.    - Impression -  1.   No acute intracranial process.  2.   Large chronic infarct in the right middle cerebral artery territory.  3.   No large arterial occlusion or significant stenosis identified in the  head or neck.    Electronically Signed by: ROBERT MOSELEY MD  Signed on: 6/4/2021 10:37 AM    ___________________________________________________________________________    CTA NECK W CONTRAST    - Narrative -  EXAMINATION:  1. Computed tomographic angiography (CTA) of the head without and with  contrast  2. CTA of the neck with contrast    HISTORY: Stroke    TECHNIQUE: CT of the head was performed without contrast according to  standard protocol. Then CT angiography of the head and neck was obtained  after the uneventful administration of 100 mL of Omnipaque 350 intravenous  contrast. Three dimensional postprocessing was performed by the  technologist and sent  to the workstation for review. Artificial  intelligence large vessel occlusion detection was applied to the CTA data.    COMPARISON: Comparison is made with a head CT from 5/8/2004.    FINDINGS:    Non-angiographic findings:    No acute intracranial hemorrhage is identified. A large volume chronic  infarct in the right middle cerebral artery territory is again seen with  cystic encephalomalacia and adjacent gliosis. The ventricles are of normal  size, shape, and morphology. The basilar cisterns are patent. No mass  effect or midline shift is seen. Periventricular white matter  hypoattenuation is indicative of chronic small vessel ischemic disease.  There is vascular calcification of the carotid siphons. Other than mild  paranasal sinus disease, the visualized portions of the orbits, paranasal  sinuses, and mastoids appear normal. No acute fracture is identified. A  chronic right frontal jaspal hole is noted.    Centrilobular and paraseptal emphysema is noted. Degenerative changes are  seen in the spine.    Neck angiography:    The visualized aortic arch appears normal. The configuration of the  brachiocephalic vessels is typical. The innominate artery and both  subclavian arteries are normal in caliber. The common carotid arteries  appear normal. There is minimal atherosclerotic disease at the carotid  bifurcations without significant focal stenosis. The cervical internal  carotid arteries appear normal. The cervical vertebral arteries are normal  with a dominant left vertebral artery.    Head angiography:    The distal internal carotid arteries appear normal. The anterior cerebral  arteries appear normal. The middle cerebral arteries appear normal. The  distal vertebral arteries appear normal. The basilar artery is normal. The  posterior cerebral arteries are normal with fetal origin on the right. No  aneurysm, vascular occlusion, or significant intracranial stenosis is  identified.    - Impression -  1.   No acute  intracranial process.  2.   Large chronic infarct in the right middle cerebral artery territory.  3.   No large arterial occlusion or significant stenosis identified in the  head or neck.    Electronically Signed by: ROBERT MOSELEY MD  Signed on: 6/4/2021 10:37 AM    LAST X-RAY:  === 08/24/24 ===    XR CHEST PA AND LATERAL 2 VIEWS    - Narrative -  EXAM: XR CHEST PA AND LATERAL 2 VIEWS    CLINICAL INDICATION: Palpitation    COMPARISON: May 8, 2004.    FINDINGS: Lung fields are grossly clear bilaterally.  No definite pleural  effusion or pneumothorax.  Heart size is within normal limits.    - Impression -  No radiographic evidence of acute cardiopulmonary process.    Electronically Signed by: EDUIN LICONA MD  Signed on: 8/24/2024 8:05 PM  Workstation ID: [id number]    LAST U/S:  === 10/21/24 ===    US BREAST DIAGNOSTIC 1-3 QUADRANTS RIGHT    - Narrative -  #[id number] - MAMMO DIAGNOSTIC RIGHT W [place]  #[id number] - US BREAST DIAGNOSTIC 1-3 QUADRANTS RIGHT    UNILATERAL RIGHT DIGITAL DIAGNOSTIC MAMMOGRAM 3D/2D AND TARGETED RIGHT ULTRASOUND: 10/21/2024    CLINICAL HISTORY:Abnormal screening for asymmetry.      COMPARISON:  Comparison is made to exams dated: 9/5/2024 mammogram - [facility] [place] and 8/24/2023 mammogram - [facility].    BREAST COMPOSITION: The breasts are heterogeneously dense, which may obscure small masses (ACR Breast Composition Category C).    American College of Radiology Breast Composition Categories  A - The breasts are almost entirely fatty.  B - There are scattered areas of fibroglandular density.  C - The breasts are heterogeneously dense, which may obscure small masses.  D - The breasts are extremely dense, which lowers the sensitivity of mammography.      FINDINGS:  Digital Breast Tomosynthesis (DBT) images were obtained and used to assist in the interpretation of this examination.    There are benign post operative findings in the right breast.    There is a  possible focal asymmetry in the right breast at 11 o'clock posterior depth 3 cm from the skin.  This effaces on the spot compression views favoring to represent superimposed fibroglandular tissue.    No other significant masses or calcifications are seen in the breast on the mammogram or right ultrasound.    Color flow and real-time ultrasound of the right breast upper outer quadrant were performed.  Gray scale images of the real-time examination were reviewed.    No significant abnormalities were seen sonographically in the right breast.    - Impression -  MAMMOGRAPHY  PROBABLY BENIGN, ULTRASOUND  1. The possible focal asymmetry in the right breast most likely is fibroglandular tissue and is probably benign.  Follow-up mammogram and possible ultrasound in 6 months is recommended to demonstrate stability.      Results and recommendations discussed with the patient after exam completion.    OVERALL STUDY BIRADS: Category 3: Probably Benign    Electronically Signed by: Stephen Villalpando M.D.  tm/:10/21/2024 12:16:55      letter sent: Patient Handed Results      ASSESSMENT/PLAN:    Crohn's disease of the sigmoid and right colon with stricturing of the sigmoid.  She is currently on third dose of Skyrizi infusion.  She is not feeling significantly better.    Plan: We will give her a brief course of prednisone to try to help her until the Skyrizi starts to really kick end, we have ordered the injections to be started in 7 weeks.  She will follow-up with us in 3 months         The patient indicated understanding of the diagnosis and agreed with the plan of care.     A copy of this note was sent to the referring provider. Thank you for allowing me to participate in the care of this patient.     decreased strength/pain/decreased ROM